# Patient Record
Sex: FEMALE | Race: AMERICAN INDIAN OR ALASKA NATIVE
[De-identification: names, ages, dates, MRNs, and addresses within clinical notes are randomized per-mention and may not be internally consistent; named-entity substitution may affect disease eponyms.]

---

## 2020-03-09 ENCOUNTER — HOSPITAL ENCOUNTER (EMERGENCY)
Dept: HOSPITAL 5 - ED | Age: 35
Discharge: LEFT BEFORE DISCHARGE | End: 2020-03-09
Payer: MEDICAID

## 2020-03-09 VITALS — SYSTOLIC BLOOD PRESSURE: 142 MMHG | DIASTOLIC BLOOD PRESSURE: 93 MMHG

## 2020-03-09 DIAGNOSIS — M25.561: Primary | ICD-10-CM

## 2020-03-09 DIAGNOSIS — G89.29: ICD-10-CM

## 2020-03-09 DIAGNOSIS — M25.562: ICD-10-CM

## 2020-03-09 PROCEDURE — 99282 EMERGENCY DEPT VISIT SF MDM: CPT

## 2020-03-09 NOTE — EMERGENCY DEPARTMENT REPORT
ED General Adult HPI





- General


Chief complaint: Extremity Problem,Nontraumatic


Stated complaint: KNEE PAIN


Time Seen by Provider: 03/09/20 14:29


Source: patient


Mode of arrival: Wheelchair


Limitations: No Limitations





- History of Present Illness


Initial comments: 





This is a 34-year-old female nontoxic, well nourished in appearance, no acute 

signs of distress presents to the ED with c/o of chronic bilateral knee pain x 4

months and is in need of  due to homeless.  Patient stated had she 

sees a PCP and has imaging studies ordered.  Patient denies any new trauma or 

injuries.  Denies decreased ROM, joint swelling, redness, or abnormal gait.  

Denies any fever, chills, nausea, vomiting, headache, stiff neck, chest pain or 

shortness of breath.  Patient denies any numbness or tingling.  PMH includes MS.


-: month(s)


Location: lower extremity


Radiation: non-radiation


Severity scale (0 -10): 3


Quality: aching


Consistency: constant


Improves with: none


Worsens with: none


Associated Symptoms: denies other symptoms.  denies: confusion, chest pain, 

cough, diaphoresis, fever/chills, loss of appetite, malaise, nausea/vomiting, 

rash, seizure, shortness of breath, syncope, weakness


Treatments Prior to Arrival: none





ED Review of Systems


ROS: 


Stated complaint: KNEE PAIN


Other details as noted in HPI





Constitutional: denies: chills, fever


Eyes: denies: eye pain, eye discharge, vision change


ENT: denies: ear pain, throat pain


Respiratory: denies: cough, shortness of breath, wheezing


Cardiovascular: denies: chest pain, palpitations


Endocrine: no symptoms reported


Gastrointestinal: denies: abdominal pain, nausea, diarrhea


Genitourinary: denies: urgency, dysuria, discharge


Musculoskeletal: denies: back pain, joint swelling, arthralgia


Skin: denies: rash, lesions


Neurological: denies: headache, weakness, paresthesias


Psychiatric: denies: anxiety, depression


Hematological/Lymphatic: denies: easy bleeding, easy bruising





ED Past Medical Hx





- Past Medical History


Previous Medical History?: Yes


Additional medical history: MS





ED Physical Exam





- General


Limitations: No Limitations


General appearance: alert, in no apparent distress





- Head


Head exam: Present: atraumatic, normocephalic





- Neck


Neck exam: Present: normal inspection, full ROM.  Absent: tenderness, menin

gismus, lymphadenopathy





- Extremities Exam


Extremities exam: Present: normal inspection, full ROM, tenderness, normal 

capillary refill.  Absent: joint swelling, calf tenderness





- Expanded Lower Extremity Exam


  ** Left


Hip exam: Present: normal inspection (bilateral exam), full ROM (bilateral 

exam).  Absent: tenderness, swelling, abrasion


Upper Leg exam: Present: normal inspection (bilateral exam), full ROM (bilateral

exam).  Absent: tenderness, swelling


Knee exam: Present: normal inspection (bilateral exam), full ROM (bilateral 

exam), full knee extension.  Absent: tenderness, swelling, abrasion, laceration,

ecchymosis, deformity, crepidus, dislocation, erythema, effusion, pain w/ 

pronation/supination, posterior draw sign, pain/laxity with valgus, pain/laxity 

with varus


Lower Leg exam: Present: normal inspection (bilateral exam), full ROM (bilateral

exam).  Absent: tenderness, swelling


Ankle exam: Present: normal inspection (bilateral exam), full ROM (bilateral 

exam).  Absent: tenderness, swelling


Foot/Toe exam: Present: normal inspection (bilateral exam), full ROM (bilateral 

exam).  Absent: tenderness, swelling


Neuro vascular tendon exam: Present: no vascular compromise (bilateral exam)


Gait: Positive: observed and limited by pain





- Back Exam


Back exam: Present: normal inspection, full ROM.  Absent: tenderness, CVA 

tenderness (R), CVA tenderness (L), muscle spasm, paraspinal tenderness, 

vertebral tenderness, rash noted





- Neurological Exam


Neurological exam: Present: alert, oriented X3





- Psychiatric


Psychiatric exam: Present: normal affect, normal mood





- Skin


Skin exam: Present: warm, dry, intact, normal color.  Absent: rash





ED Course





- Reevaluation(s)


Reevaluation #1: 





03/09/20 15:39


Patient is speaking in full sentences with no signs of distress noted.





- Consultations


Consultation #1: 





03/09/20 15:39


Consult with [facility] () which came and spoke to patient and gave 

reference for housing.





ED Medical Decision Making





- Medical Decision Making





This is a 34-year-old female that presents with chronic knee pain.  Patient is 

stable and was examined by me.  Exam does not show any acute conditions.  No 

joint effusion, no redness, no decreased ROM.  Normal gait.  Patient was 

instructed to Follow-up with a orthopedic doctor in 3-5 days or if symptoms 

worsen and continue return to emergency room as soon as possible.  At time of 

discharge, the patient does not seem toxic or ill in appearance.  No acute signs

of distress noted.  Patient agrees to discharge treatment plan of care.  No 

further questions noted by the patient.





Critical care attestation.: 


If time is entered above; I have spent that time in minutes in the direct care 

of this critically ill patient, excluding procedure time.








ED Disposition


Clinical Impression: 


Chronic knee pain


Qualifiers:


 Laterality: bilateral Qualified Code(s): M25.561 - Pain in right knee; M25.562 

- Pain in left knee; G89.29 - Other chronic pain





Disposition: Z-07 MED SCREENING EXAM-LEFT


Is pt being admited?: No


Does the pt Need Aspirin: No


Condition: Stable


Instructions:  Knee Pain (ED)


Additional Instructions: 


Follow-up with a orthopedic doctor in 3-5 days or if symptoms worsen and 

continue return to emergency room as soon as possible.  


Referrals: 


PRIMARY CARE,MD [Referring] - 3-5 Days


ROCHELLE PABON MD [Staff Physician] - 3-5 Days


RICARDO RIVERA MD [Staff Physician] - 3-5 Days


[facility] [Outside] - 3-5 Days